# Patient Record
(demographics unavailable — no encounter records)

---

## 2025-07-03 NOTE — PLAN
[FreeTextEntry1] : Pap and cultures to lab. SBE encouraged possible causes for irregular menses discussed. blood work sent and gyn sono scheduled.

## 2025-07-03 NOTE — HISTORY OF PRESENT ILLNESS
[FreeTextEntry1] : pt presents for annual with c/o menses becoming irregular. states they now come for 3 days then come back after 2-3 days

## 2025-07-03 NOTE — PHYSICAL EXAM
[Chaperoned Physical Exam] : A chaperone was present in the examining room during all aspects of the physical examination. [MA] : MA [FreeTextEntry2] : kk [Appropriately responsive] : appropriately responsive [Alert] : alert [No Acute Distress] : no acute distress [No Lymphadenopathy] : no lymphadenopathy [Regular Rate Rhythm] : regular rate rhythm [No Murmurs] : no murmurs [Clear to Auscultation B/L] : clear to auscultation bilaterally [Soft] : soft [Non-tender] : non-tender [Non-distended] : non-distended [No HSM] : No HSM [No Lesions] : no lesions [No Mass] : no mass [Oriented x3] : oriented x3 [Examination Of The Breasts] : a normal appearance [No Masses] : no breast masses were palpable [Labia Majora] : normal [Labia Minora] : normal [Discharge] : a  ~M vaginal discharge was present [Moderate] : moderate [White] : white [Cheesy] : cheesy [Normal] : normal [Uterine Adnexae] : normal

## 2025-07-24 NOTE — PLAN
[FreeTextEntry1] : sono discussed. pt states no further irregular bleeding after treated for vaginal infection